# Patient Record
Sex: MALE | Race: OTHER | URBAN - METROPOLITAN AREA
[De-identification: names, ages, dates, MRNs, and addresses within clinical notes are randomized per-mention and may not be internally consistent; named-entity substitution may affect disease eponyms.]

---
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
## 2025-01-01 ENCOUNTER — APPOINTMENT (OUTPATIENT)
Dept: RADIOLOGY | Facility: MEDICAL CENTER | Age: 25
DRG: 951 | End: 2025-01-01

## 2025-01-01 ENCOUNTER — HOSPITAL ENCOUNTER (INPATIENT)
Facility: MEDICAL CENTER | Age: 25
LOS: 1 days | DRG: 951 | End: 2025-04-12
Attending: EMERGENCY MEDICINE | Admitting: INTERNAL MEDICINE

## 2025-01-01 ENCOUNTER — APPOINTMENT (OUTPATIENT)
Dept: RADIOLOGY | Facility: MEDICAL CENTER | Age: 25
DRG: 951 | End: 2025-01-01
Attending: EMERGENCY MEDICINE

## 2025-01-01 ENCOUNTER — HOSPITAL ENCOUNTER (EMERGENCY)
Facility: MEDICAL CENTER | Age: 25
End: 2025-04-09
Attending: EMERGENCY MEDICINE

## 2025-01-01 ENCOUNTER — APPOINTMENT (OUTPATIENT)
Dept: CARDIOLOGY | Facility: MEDICAL CENTER | Age: 25
DRG: 951 | End: 2025-01-01
Attending: INTERNAL MEDICINE

## 2025-01-01 VITALS
OXYGEN SATURATION: 99 % | RESPIRATION RATE: 29 BRPM | TEMPERATURE: 97.6 F | HEIGHT: 68 IN | BODY MASS INDEX: 21.48 KG/M2 | HEART RATE: 86 BPM | WEIGHT: 141.76 LBS | SYSTOLIC BLOOD PRESSURE: 88 MMHG | DIASTOLIC BLOOD PRESSURE: 53 MMHG

## 2025-01-01 VITALS
BODY MASS INDEX: 20.34 KG/M2 | RESPIRATION RATE: 16 BRPM | WEIGHT: 150.13 LBS | SYSTOLIC BLOOD PRESSURE: 120 MMHG | HEART RATE: 52 BPM | HEIGHT: 72 IN | DIASTOLIC BLOOD PRESSURE: 76 MMHG | OXYGEN SATURATION: 96 % | TEMPERATURE: 97 F

## 2025-01-01 DIAGNOSIS — G93.89 BRAIN MASS: ICD-10-CM

## 2025-01-01 DIAGNOSIS — R41.82 ALTERED MENTAL STATUS, UNSPECIFIED ALTERED MENTAL STATUS TYPE: ICD-10-CM

## 2025-01-01 DIAGNOSIS — G43.009 MIGRAINE WITHOUT AURA AND WITHOUT STATUS MIGRAINOSUS, NOT INTRACTABLE: ICD-10-CM

## 2025-01-01 LAB
ABO GROUP BLD: NORMAL
ALBUMIN SERPL BCP-MCNC: 5.4 G/DL (ref 3.2–4.9)
ALBUMIN/GLOB SERPL: 1.7 G/DL
ALP SERPL-CCNC: 58 U/L (ref 30–99)
ALT SERPL-CCNC: 13 U/L (ref 2–50)
ANION GAP SERPL CALC-SCNC: 24 MMOL/L (ref 7–16)
APTT PPP: 23.7 SEC (ref 24.7–36)
AST SERPL-CCNC: 23 U/L (ref 12–45)
BASE EXCESS BLDA CALC-SCNC: -3 MMOL/L (ref -4–3)
BASOPHILS # BLD AUTO: 0.3 % (ref 0–1.8)
BASOPHILS # BLD: 0.07 K/UL (ref 0–0.12)
BILIRUB SERPL-MCNC: 1.1 MG/DL (ref 0.1–1.5)
BLD GP AB SCN SERPL QL: NORMAL
BODY TEMPERATURE: ABNORMAL DEGREES
BREATHS SETTING VENT: 24
BUN SERPL-MCNC: 25 MG/DL (ref 8–22)
CALCIUM ALBUM COR SERPL-MCNC: 8.8 MG/DL (ref 8.5–10.5)
CALCIUM SERPL-MCNC: 9.9 MG/DL (ref 8.5–10.5)
CHLORIDE SERPL-SCNC: 107 MMOL/L (ref 96–112)
CO2 BLDA-SCNC: 23 MMOL/L (ref 32–48)
CO2 SERPL-SCNC: 16 MMOL/L (ref 20–33)
CREAT SERPL-MCNC: 1.29 MG/DL (ref 0.5–1.4)
DELSYS IDSYS: ABNORMAL
EKG IMPRESSION: NORMAL
EOSINOPHIL # BLD AUTO: 0.05 K/UL (ref 0–0.51)
EOSINOPHIL NFR BLD: 0.2 % (ref 0–6.9)
ERYTHROCYTE [DISTWIDTH] IN BLOOD BY AUTOMATED COUNT: 36 FL (ref 35.9–50)
EST. AVERAGE GLUCOSE BLD GHB EST-MCNC: 108 MG/DL
GFR SERPLBLD CREATININE-BSD FMLA CKD-EPI: 80 ML/MIN/1.73 M 2
GLOBULIN SER CALC-MCNC: 3.1 G/DL (ref 1.9–3.5)
GLUCOSE BLD STRIP.AUTO-MCNC: 111 MG/DL (ref 65–99)
GLUCOSE SERPL-MCNC: 199 MG/DL (ref 65–99)
HBA1C MFR BLD: 5.4 % (ref 4–5.6)
HCO3 BLDA-SCNC: 21.7 MMOL/L (ref 21–28)
HCT VFR BLD AUTO: 47.3 % (ref 42–52)
HGB BLD-MCNC: 16.3 G/DL (ref 14–18)
HOROWITZ INDEX BLDA+IHG-RTO: 314 MM[HG]
IMM GRANULOCYTES # BLD AUTO: 0.19 K/UL (ref 0–0.11)
IMM GRANULOCYTES NFR BLD AUTO: 0.8 % (ref 0–0.9)
INR PPP: 1.12 (ref 0.87–1.13)
LACTATE BLD-SCNC: 3.7 MMOL/L (ref 0.5–2)
LYMPHOCYTES # BLD AUTO: 2.9 K/UL (ref 1–4.8)
LYMPHOCYTES NFR BLD: 11.7 % (ref 22–41)
MAGNESIUM SERPL-MCNC: 2 MG/DL (ref 1.5–2.5)
MCH RBC QN AUTO: 29.8 PG (ref 27–33)
MCHC RBC AUTO-ENTMCNC: 34.5 G/DL (ref 32.3–36.5)
MCV RBC AUTO: 86.5 FL (ref 81.4–97.8)
MODE IMODE: ABNORMAL
MONOCYTES # BLD AUTO: 1.4 K/UL (ref 0–0.85)
MONOCYTES NFR BLD AUTO: 5.6 % (ref 0–13.4)
NEUTROPHILS # BLD AUTO: 20.28 K/UL (ref 1.82–7.42)
NEUTROPHILS NFR BLD: 81.4 % (ref 44–72)
NRBC # BLD AUTO: 0 K/UL
NRBC BLD-RTO: 0 /100 WBC (ref 0–0.2)
O2/TOTAL GAS SETTING VFR VENT: 50 %
PCO2 BLDA: 37.3 MMHG (ref 32–48)
PCO2 TEMP ADJ BLDA: 36.3 MMHG (ref 32–48)
PEEP END EXPIRATORY PRESSURE IPEEP: 8 CMH20
PH BLDA: 7.37 [PH] (ref 7.35–7.45)
PH TEMP ADJ BLDA: 7.38 [PH] (ref 7.35–7.45)
PLATELET # BLD AUTO: 360 K/UL (ref 164–446)
PMV BLD AUTO: 10.6 FL (ref 9–12.9)
PO2 BLDA: 157 MMHG (ref 83–108)
PO2 TEMP ADJ BLDA: 154 MMHG (ref 83–108)
POTASSIUM SERPL-SCNC: 3.8 MMOL/L (ref 3.6–5.5)
PROT SERPL-MCNC: 8.5 G/DL (ref 6–8.2)
PROTHROMBIN TIME: 14.5 SEC (ref 12–14.6)
RBC # BLD AUTO: 5.47 M/UL (ref 4.7–6.1)
RH BLD: NORMAL
SAO2 % BLDA: 99 % (ref 93–99)
SODIUM SERPL-SCNC: 147 MMOL/L (ref 135–145)
SPECIMEN DRAWN FROM PATIENT: ABNORMAL
TIDAL VOLUME IVT: 400 ML
TRIGL SERPL-MCNC: 142 MG/DL (ref 0–149)
TROPONIN T SERPL-MCNC: 7 NG/L (ref 6–19)
WBC # BLD AUTO: 24.9 K/UL (ref 4.8–10.8)

## 2025-01-01 PROCEDURE — 99291 CRITICAL CARE FIRST HOUR: CPT | Performed by: INTERNAL MEDICINE

## 2025-01-01 PROCEDURE — 71045 X-RAY EXAM CHEST 1 VIEW: CPT

## 2025-01-01 PROCEDURE — 85025 COMPLETE CBC W/AUTO DIFF WBC: CPT

## 2025-01-01 PROCEDURE — 83605 ASSAY OF LACTIC ACID: CPT

## 2025-01-01 PROCEDURE — 83735 ASSAY OF MAGNESIUM: CPT

## 2025-01-01 PROCEDURE — 82803 BLOOD GASES ANY COMBINATION: CPT

## 2025-01-01 PROCEDURE — 93325 DOPPLER ECHO COLOR FLOW MAPG: CPT

## 2025-01-01 PROCEDURE — 96366 THER/PROPH/DIAG IV INF ADDON: CPT

## 2025-01-01 PROCEDURE — 0BH17EZ INSERTION OF ENDOTRACHEAL AIRWAY INTO TRACHEA, VIA NATURAL OR ARTIFICIAL OPENING: ICD-10-PCS | Performed by: EMERGENCY MEDICINE

## 2025-01-01 PROCEDURE — 86900 BLOOD TYPING SEROLOGIC ABO: CPT

## 2025-01-01 PROCEDURE — 96375 TX/PRO/DX INJ NEW DRUG ADDON: CPT

## 2025-01-01 PROCEDURE — 700111 HCHG RX REV CODE 636 W/ 250 OVERRIDE (IP): Mod: JZ | Performed by: EMERGENCY MEDICINE

## 2025-01-01 PROCEDURE — 96365 THER/PROPH/DIAG IV INF INIT: CPT

## 2025-01-01 PROCEDURE — 304538 HCHG NG TUBE

## 2025-01-01 PROCEDURE — 700111 HCHG RX REV CODE 636 W/ 250 OVERRIDE (IP): Mod: JZ | Performed by: INTERNAL MEDICINE

## 2025-01-01 PROCEDURE — 5A1935Z RESPIRATORY VENTILATION, LESS THAN 24 CONSECUTIVE HOURS: ICD-10-PCS | Performed by: INTERNAL MEDICINE

## 2025-01-01 PROCEDURE — 86901 BLOOD TYPING SEROLOGIC RH(D): CPT

## 2025-01-01 PROCEDURE — 700101 HCHG RX REV CODE 250: Performed by: EMERGENCY MEDICINE

## 2025-01-01 PROCEDURE — 84478 ASSAY OF TRIGLYCERIDES: CPT

## 2025-01-01 PROCEDURE — 72125 CT NECK SPINE W/O DYE: CPT

## 2025-01-01 PROCEDURE — 36600 WITHDRAWAL OF ARTERIAL BLOOD: CPT

## 2025-01-01 PROCEDURE — 99283 EMERGENCY DEPT VISIT LOW MDM: CPT

## 2025-01-01 PROCEDURE — 84484 ASSAY OF TROPONIN QUANT: CPT

## 2025-01-01 PROCEDURE — 99254 IP/OBS CNSLTJ NEW/EST MOD 60: CPT

## 2025-01-01 PROCEDURE — 99292 CRITICAL CARE ADDL 30 MIN: CPT | Performed by: INTERNAL MEDICINE

## 2025-01-01 PROCEDURE — 70498 CT ANGIOGRAPHY NECK: CPT

## 2025-01-01 PROCEDURE — 303105 HCHG CATHETER EXTRA

## 2025-01-01 PROCEDURE — A9270 NON-COVERED ITEM OR SERVICE: HCPCS | Performed by: EMERGENCY MEDICINE

## 2025-01-01 PROCEDURE — 770001 HCHG ROOM/CARE - MED/SURG/GYN PRIV*

## 2025-01-01 PROCEDURE — 51702 INSERT TEMP BLADDER CATH: CPT

## 2025-01-01 PROCEDURE — 93005 ELECTROCARDIOGRAM TRACING: CPT | Mod: TC

## 2025-01-01 PROCEDURE — 700117 HCHG RX CONTRAST REV CODE 255: Performed by: INTERNAL MEDICINE

## 2025-01-01 PROCEDURE — 700105 HCHG RX REV CODE 258: Performed by: EMERGENCY MEDICINE

## 2025-01-01 PROCEDURE — 93325 DOPPLER ECHO COLOR FLOW MAPG: CPT | Mod: 26 | Performed by: STUDENT IN AN ORGANIZED HEALTH CARE EDUCATION/TRAINING PROGRAM

## 2025-01-01 PROCEDURE — 70496 CT ANGIOGRAPHY HEAD: CPT

## 2025-01-01 PROCEDURE — 93308 TTE F-UP OR LMTD: CPT | Mod: 26 | Performed by: STUDENT IN AN ORGANIZED HEALTH CARE EDUCATION/TRAINING PROGRAM

## 2025-01-01 PROCEDURE — 85730 THROMBOPLASTIN TIME PARTIAL: CPT

## 2025-01-01 PROCEDURE — 93321 DOPPLER ECHO F-UP/LMTD STD: CPT | Mod: 26 | Performed by: STUDENT IN AN ORGANIZED HEALTH CARE EDUCATION/TRAINING PROGRAM

## 2025-01-01 PROCEDURE — 700111 HCHG RX REV CODE 636 W/ 250 OVERRIDE (IP): Performed by: INTERNAL MEDICINE

## 2025-01-01 PROCEDURE — 700102 HCHG RX REV CODE 250 W/ 637 OVERRIDE(OP): Performed by: EMERGENCY MEDICINE

## 2025-01-01 PROCEDURE — 82962 GLUCOSE BLOOD TEST: CPT

## 2025-01-01 PROCEDURE — 93005 ELECTROCARDIOGRAM TRACING: CPT | Mod: TC | Performed by: EMERGENCY MEDICINE

## 2025-01-01 PROCEDURE — 700117 HCHG RX CONTRAST REV CODE 255: Performed by: EMERGENCY MEDICINE

## 2025-01-01 PROCEDURE — 86850 RBC ANTIBODY SCREEN: CPT

## 2025-01-01 PROCEDURE — 80053 COMPREHEN METABOLIC PANEL: CPT

## 2025-01-01 PROCEDURE — 36415 COLL VENOUS BLD VENIPUNCTURE: CPT

## 2025-01-01 PROCEDURE — 94002 VENT MGMT INPAT INIT DAY: CPT

## 2025-01-01 PROCEDURE — 83036 HEMOGLOBIN GLYCOSYLATED A1C: CPT

## 2025-01-01 PROCEDURE — 85610 PROTHROMBIN TIME: CPT

## 2025-01-01 PROCEDURE — 31500 INSERT EMERGENCY AIRWAY: CPT

## 2025-01-01 PROCEDURE — 99291 CRITICAL CARE FIRST HOUR: CPT

## 2025-01-01 RX ORDER — LEVETIRACETAM 500 MG/5ML
2000 INJECTION, SOLUTION, CONCENTRATE INTRAVENOUS ONCE
Status: COMPLETED | OUTPATIENT
Start: 2025-01-01 | End: 2025-01-01

## 2025-01-01 RX ORDER — LORAZEPAM 2 MG/ML
1 CONCENTRATE ORAL
Status: DISCONTINUED | OUTPATIENT
Start: 2025-01-01 | End: 2025-04-13 | Stop reason: HOSPADM

## 2025-01-01 RX ORDER — HYDROMORPHONE HYDROCHLORIDE 2 MG/ML
4 INJECTION, SOLUTION INTRAMUSCULAR; INTRAVENOUS; SUBCUTANEOUS ONCE
Status: COMPLETED | OUTPATIENT
Start: 2025-01-01 | End: 2025-01-01

## 2025-01-01 RX ORDER — POLYETHYLENE GLYCOL 3350 17 G/17G
1 POWDER, FOR SOLUTION ORAL
Status: DISCONTINUED | OUTPATIENT
Start: 2025-01-01 | End: 2025-01-01

## 2025-01-01 RX ORDER — INSULIN LISPRO 100 [IU]/ML
2-9 INJECTION, SOLUTION INTRAVENOUS; SUBCUTANEOUS EVERY 6 HOURS
Status: DISCONTINUED | OUTPATIENT
Start: 2025-01-01 | End: 2025-01-01

## 2025-01-01 RX ORDER — SUMATRIPTAN SUCCINATE 25 MG/1
25-50 TABLET ORAL
Qty: 10 TABLET | Refills: 1 | Status: SHIPPED | OUTPATIENT
Start: 2025-01-01 | End: 2025-04-13

## 2025-01-01 RX ORDER — BISACODYL 10 MG
10 SUPPOSITORY, RECTAL RECTAL
Status: DISCONTINUED | OUTPATIENT
Start: 2025-01-01 | End: 2025-01-01

## 2025-01-01 RX ORDER — AMOXICILLIN 250 MG
2 CAPSULE ORAL 2 TIMES DAILY
Status: DISCONTINUED | OUTPATIENT
Start: 2025-01-01 | End: 2025-01-01

## 2025-01-01 RX ORDER — ACETAMINOPHEN 325 MG/1
650 TABLET ORAL EVERY 4 HOURS PRN
Status: DISCONTINUED | OUTPATIENT
Start: 2025-01-01 | End: 2025-04-13 | Stop reason: HOSPADM

## 2025-01-01 RX ORDER — ATROPINE SULFATE 10 MG/ML
2 SOLUTION/ DROPS OPHTHALMIC EVERY 4 HOURS PRN
Status: DISCONTINUED | OUTPATIENT
Start: 2025-01-01 | End: 2025-04-13 | Stop reason: HOSPADM

## 2025-01-01 RX ORDER — DEXAMETHASONE SODIUM PHOSPHATE 10 MG/ML
10 INJECTION, SOLUTION INTRAMUSCULAR; INTRAVENOUS ONCE
Status: COMPLETED | OUTPATIENT
Start: 2025-01-01 | End: 2025-01-01

## 2025-01-01 RX ORDER — ROCURONIUM BROMIDE 10 MG/ML
50 INJECTION, SOLUTION INTRAVENOUS ONCE
Status: COMPLETED | OUTPATIENT
Start: 2025-01-01 | End: 2025-01-01

## 2025-01-01 RX ORDER — IPRATROPIUM BROMIDE AND ALBUTEROL SULFATE 2.5; .5 MG/3ML; MG/3ML
3 SOLUTION RESPIRATORY (INHALATION)
Status: DISCONTINUED | OUTPATIENT
Start: 2025-01-01 | End: 2025-04-13 | Stop reason: HOSPADM

## 2025-01-01 RX ORDER — HYDROMORPHONE HYDROCHLORIDE 1 MG/ML
1 INJECTION, SOLUTION INTRAMUSCULAR; INTRAVENOUS; SUBCUTANEOUS
Status: DISCONTINUED | OUTPATIENT
Start: 2025-01-01 | End: 2025-04-13 | Stop reason: HOSPADM

## 2025-01-01 RX ORDER — MANNITOL 250 MG/ML
50 INJECTION, SOLUTION INTRAVENOUS ONCE
Status: COMPLETED | OUTPATIENT
Start: 2025-01-01 | End: 2025-01-01

## 2025-01-01 RX ORDER — FAMOTIDINE 20 MG/1
20 TABLET, FILM COATED ORAL EVERY 12 HOURS
Status: DISCONTINUED | OUTPATIENT
Start: 2025-01-01 | End: 2025-01-01

## 2025-01-01 RX ORDER — DIPHENHYDRAMINE HYDROCHLORIDE 50 MG/ML
50 INJECTION, SOLUTION INTRAMUSCULAR; INTRAVENOUS ONCE
Status: COMPLETED | OUTPATIENT
Start: 2025-01-01 | End: 2025-01-01

## 2025-01-01 RX ORDER — IBUPROFEN 600 MG/1
600 TABLET, FILM COATED ORAL ONCE
Status: COMPLETED | OUTPATIENT
Start: 2025-01-01 | End: 2025-01-01

## 2025-01-01 RX ORDER — DIAZEPAM 10 MG/2ML
5 INJECTION, SOLUTION INTRAMUSCULAR; INTRAVENOUS
Status: DISCONTINUED | OUTPATIENT
Start: 2025-01-01 | End: 2025-04-13 | Stop reason: HOSPADM

## 2025-01-01 RX ORDER — HYDROMORPHONE HYDROCHLORIDE 1 MG/ML
0.5 INJECTION, SOLUTION INTRAMUSCULAR; INTRAVENOUS; SUBCUTANEOUS
Status: DISCONTINUED | OUTPATIENT
Start: 2025-01-01 | End: 2025-04-13 | Stop reason: HOSPADM

## 2025-01-01 RX ORDER — METHYLPREDNISOLONE SODIUM SUCCINATE 125 MG/2ML
125 INJECTION, POWDER, LYOPHILIZED, FOR SOLUTION INTRAMUSCULAR; INTRAVENOUS ONCE
Status: COMPLETED | OUTPATIENT
Start: 2025-01-01 | End: 2025-01-01

## 2025-01-01 RX ORDER — PROPOFOL 10 MG/ML
60 INJECTION, EMULSION INTRAVENOUS ONCE
Status: COMPLETED | OUTPATIENT
Start: 2025-01-01 | End: 2025-01-01

## 2025-01-01 RX ORDER — DEXTROSE MONOHYDRATE 25 G/50ML
25 INJECTION, SOLUTION INTRAVENOUS
Status: DISCONTINUED | OUTPATIENT
Start: 2025-01-01 | End: 2025-01-01

## 2025-01-01 RX ORDER — LABETALOL HYDROCHLORIDE 5 MG/ML
10-20 INJECTION, SOLUTION INTRAVENOUS
Status: DISCONTINUED | OUTPATIENT
Start: 2025-01-01 | End: 2025-04-13 | Stop reason: HOSPADM

## 2025-01-01 RX ORDER — SUMATRIPTAN SUCCINATE 25 MG/1
25 TABLET ORAL ONCE
Status: COMPLETED | OUTPATIENT
Start: 2025-01-01 | End: 2025-01-01

## 2025-01-01 RX ADMIN — LEVETIRACETAM 2000 MG: 100 INJECTION, SOLUTION INTRAVENOUS at 10:29

## 2025-01-01 RX ADMIN — ROCURONIUM BROMIDE 50 MG: 10 INJECTION INTRAVENOUS at 09:46

## 2025-01-01 RX ADMIN — PROPOFOL 60 MG: 10 INJECTION, EMULSION INTRAVENOUS at 09:46

## 2025-01-01 RX ADMIN — PROPOFOL 5 MCG/KG/MIN: 10 INJECTION, EMULSION INTRAVENOUS at 10:06

## 2025-01-01 RX ADMIN — FAMOTIDINE 40 MG: 10 INJECTION, SOLUTION INTRAVENOUS at 14:21

## 2025-01-01 RX ADMIN — SUMATRIPTAN SUCCINATE 25 MG: 25 TABLET ORAL at 09:18

## 2025-01-01 RX ADMIN — HUMAN ALBUMIN MICROSPHERES AND PERFLUTREN 3 ML: 10; .22 INJECTION, SOLUTION INTRAVENOUS at 17:14

## 2025-01-01 RX ADMIN — METHYLPREDNISOLONE SODIUM SUCCINATE 125 MG: 125 INJECTION, POWDER, FOR SOLUTION INTRAMUSCULAR; INTRAVENOUS at 14:21

## 2025-01-01 RX ADMIN — HYDROMORPHONE HYDROCHLORIDE 4 MG: 2 INJECTION INTRAMUSCULAR; INTRAVENOUS; SUBCUTANEOUS at 11:18

## 2025-01-01 RX ADMIN — HYDROMORPHONE HYDROCHLORIDE 1 MG: 1 INJECTION, SOLUTION INTRAMUSCULAR; INTRAVENOUS; SUBCUTANEOUS at 18:13

## 2025-01-01 RX ADMIN — DIPHENHYDRAMINE HYDROCHLORIDE 50 MG: 50 INJECTION, SOLUTION INTRAMUSCULAR; INTRAVENOUS at 15:39

## 2025-01-01 RX ADMIN — SODIUM CHLORIDE 250 ML: 3 INJECTION, SOLUTION INTRAVENOUS at 10:53

## 2025-01-01 RX ADMIN — DIAZEPAM 5 MG: 10 INJECTION, SOLUTION INTRAMUSCULAR; INTRAVENOUS at 18:12

## 2025-01-01 RX ADMIN — DEXAMETHASONE SODIUM PHOSPHATE 10 MG: 10 INJECTION, SOLUTION INTRAMUSCULAR; INTRAVENOUS at 10:56

## 2025-01-01 RX ADMIN — IBUPROFEN 600 MG: 600 TABLET, FILM COATED ORAL at 09:18

## 2025-01-01 RX ADMIN — MANNITOL 50 G: 12.5 INJECTION, SOLUTION INTRAVENOUS at 09:59

## 2025-01-01 RX ADMIN — IOHEXOL 80 ML: 350 INJECTION, SOLUTION INTRAVENOUS at 11:00

## 2025-01-01 ASSESSMENT — FIBROSIS 4 INDEX: FIB4 SCORE: 0.41

## 2025-01-01 ASSESSMENT — PAIN DESCRIPTION - PAIN TYPE
TYPE: ACUTE PAIN
TYPE: ACUTE PAIN

## 2025-02-10 ENCOUNTER — APPOINTMENT (OUTPATIENT)
Dept: RADIOLOGY | Facility: MEDICAL CENTER | Age: 25
End: 2025-02-10
Attending: EMERGENCY MEDICINE

## 2025-02-10 ENCOUNTER — HOSPITAL ENCOUNTER (EMERGENCY)
Facility: MEDICAL CENTER | Age: 25
End: 2025-02-10
Attending: EMERGENCY MEDICINE

## 2025-02-10 VITALS
WEIGHT: 165.79 LBS | DIASTOLIC BLOOD PRESSURE: 83 MMHG | OXYGEN SATURATION: 97 % | RESPIRATION RATE: 14 BRPM | HEART RATE: 79 BPM | BODY MASS INDEX: 22.46 KG/M2 | HEIGHT: 72 IN | TEMPERATURE: 97.3 F | SYSTOLIC BLOOD PRESSURE: 115 MMHG

## 2025-02-10 DIAGNOSIS — M79.10 MYALGIA: ICD-10-CM

## 2025-02-10 DIAGNOSIS — M54.9 PAIN, UPPER BACK: ICD-10-CM

## 2025-02-10 LAB
AMORPHOUS CRYSTALS 1764: PRESENT /HPF
APPEARANCE UR: ABNORMAL
BACTERIA #/AREA URNS HPF: ABNORMAL /HPF
BILIRUB UR QL STRIP.AUTO: NEGATIVE
CASTS URNS QL MICRO: ABNORMAL /LPF (ref 0–2)
COLOR UR: YELLOW
EPITHELIAL CELLS 1715: ABNORMAL /HPF (ref 0–5)
GLUCOSE UR STRIP.AUTO-MCNC: NEGATIVE MG/DL
HYALINE CAST   1831: PRESENT /LPF
KETONES UR STRIP.AUTO-MCNC: 15 MG/DL
LEUKOCYTE ESTERASE UR QL STRIP.AUTO: ABNORMAL
MICRO URNS: ABNORMAL
NITRITE UR QL STRIP.AUTO: NEGATIVE
PH UR STRIP.AUTO: 5.5 [PH] (ref 5–8)
PROT UR QL STRIP: 30 MG/DL
RBC # URNS HPF: ABNORMAL /HPF (ref 0–2)
RBC UR QL AUTO: ABNORMAL
SP GR UR STRIP.AUTO: 1.03
UROBILINOGEN UR STRIP.AUTO-MCNC: 1 EU/DL
WBC #/AREA URNS HPF: ABNORMAL /HPF

## 2025-02-10 PROCEDURE — 99283 EMERGENCY DEPT VISIT LOW MDM: CPT

## 2025-02-10 PROCEDURE — 700102 HCHG RX REV CODE 250 W/ 637 OVERRIDE(OP): Mod: UD | Performed by: EMERGENCY MEDICINE

## 2025-02-10 PROCEDURE — 81001 URINALYSIS AUTO W/SCOPE: CPT

## 2025-02-10 PROCEDURE — A9270 NON-COVERED ITEM OR SERVICE: HCPCS | Mod: UD | Performed by: EMERGENCY MEDICINE

## 2025-02-10 PROCEDURE — 72070 X-RAY EXAM THORAC SPINE 2VWS: CPT

## 2025-02-10 PROCEDURE — 72128 CT CHEST SPINE W/O DYE: CPT

## 2025-02-10 RX ORDER — NAPROXEN 500 MG/1
500 TABLET ORAL ONCE
Status: COMPLETED | OUTPATIENT
Start: 2025-02-10 | End: 2025-02-10

## 2025-02-10 RX ADMIN — NAPROXEN 500 MG: 500 TABLET ORAL at 11:24

## 2025-02-10 ASSESSMENT — PAIN DESCRIPTION - PAIN TYPE: TYPE: ACUTE PAIN

## 2025-02-10 NOTE — ED TRIAGE NOTES
Chief Complaint   Patient presents with    Back Pain     Since Saturday, pt reports generalized back pain which has since radiated into legs and arms. Pt denies trauma.     Pt ambulatory to triage for above complaint.     Pt is alert & oriented and follows commands. Pt speaking in full sentences and responds appropriately to questions. No acute distress noted in triage. Respirations are even and unlabored. Skin is pink/warm/dry.    Pt placed back in lobby and educated on triage process. Pt encouraged to alert staff to any changes in condition.

## 2025-02-10 NOTE — ED NOTES
Pt has discharge orders. Pt educated on discharge instructions.  Pt verbalizes understanding.  Pt ambulatory to lobby with steady gait. Pt going home with self.

## 2025-02-10 NOTE — DISCHARGE INSTRUCTIONS
You may have musculoskeletal back pain.  You may have early influenza.  You will know if you develop fever and cough.  There is no evidence of significant trauma or neurologic problem.  Take ibuprofen 600 mg 3-4 times a day.  Add Tylenol 650 mg as needed for persistent pain.  You can work without limitation.  Follow-up with a doctor if not better in a week.  Return for neurologic weakness or other concerning symptoms.  These are not expected.

## 2025-02-10 NOTE — ED PROVIDER NOTES
ED Provider Note    CHIEF COMPLAINT  Chief Complaint   Patient presents with    Back Pain     Since Saturday, pt reports generalized back pain which has since radiated into legs and arms. Pt denies trauma.        EXTERNAL RECORDS REVIEWED  No old notes available    HPI  Tom Vidal is a 24 y.o. male who presents to the Emergency Department for upper back pain onset 2 days ago.  He awakened from sleep with discomfort.  No trauma or repetitive use injury.  The pain is currently in the upper back and radiates down into his proximal thighs and his arms.  He denies any weakness or numbness.  No prior fracture or disc injury or surgery.  No fever or injection medication use.      LIMITATION TO HISTORY   None     OUTSIDE HISTORIAN(S):  None    REVIEW OF SYSTEMS  Pertinent positives include: Upper back pain.  Pertinent negatives include: Weakness numbness fever.    PAST MEDICAL HISTORY  Denies    SOCIAL HISTORY  Social History     Tobacco Use    Smoking status: Never    Smokeless tobacco: Never   Vaping Use    Vaping status: Never Used   Substance Use Topics    Alcohol use: Yes     Comment: occa    Drug use: Never     Social History     Substance and Sexual Activity   Drug Use Never       CURRENT MEDICATIONS  None    ALLERGIES  No Known Allergies    PHYSICAL EXAM  VITAL SIGNS: BP (!) 141/81   Pulse (!) 109   Temp 36.3 °C (97.3 °F) (Temporal)   Resp 18   Ht 1.829 m (6')   Wt 75.2 kg (165 lb 12.6 oz)   SpO2 96%   BMI 22.48 kg/m²   Reviewed and hypertensive tachycardic afebrile  Constitutional: Well developed, Well nourished, well-appearing.  HENT: Normocephalic, atraumatic, bilateral external ears normal,   Eyes: PERRLA, no discharge, no scleral icterus.   Cardiovascular: Regular rate and rhythm. No murmurs, rubs or gallops.  No dependent edema or calf tenderness  Respiratory: Lungs clear to auscultation bilaterally. No wheezes, rales, or rhonchi.  Abdominal:  Abdomen soft, non-tender, non distended. No  rebound, or guarding.    Skin: No erythema, no rash. No bruising or wounds.   Musculoskeletal: no deformities.  He has pain without much tenderness in the mid thoracic spine.  There is no tenderness of the cervical or lumbar spine.  Neurologic: Age appropriate mental status, cranial nerves 2-12 intact by passive exam.  Straight leg raise negative.  Wrist extension, flexion, finger abduction, and thumb opposition, biceps, triceps and shoulder abduction are 5/5 bilaterally.   Extensor hallucis longus and ankle plantar flexion are symmetric.  Hip flexion and extension 5 of 5.  Deep tendon reflexes 1+ patellar Achilles biceps and triceps.  Sensation is intact on the pad of the first and fifth finger, over the first dorsal web space of the hand, And over the deltoid.  Sensation is intact to light touch in both legs.       LABS Ordered and Reviewed by Me:  Results for orders placed or performed during the hospital encounter of 02/10/25   URINALYSIS    Collection Time: 02/10/25 11:23 AM    Specimen: Urine   Result Value Ref Range    Color Yellow     Character Turbid (A)     Specific Gravity 1.027 <1.035    Ph 5.5 5.0 - 8.0    Glucose Negative Negative mg/dL    Ketones 15 (A) Negative mg/dL    Protein 30 (A) Negative mg/dL    Bilirubin Negative Negative    Urobilinogen, Urine 1.0 <=1.0 EU/dL    Nitrite Negative Negative    Leukocyte Esterase Trace (A) Negative    Occult Blood Moderate (A) Negative    Micro Urine Req Microscopic    URINE MICROSCOPIC (W/UA)    Collection Time: 02/10/25 11:23 AM   Result Value Ref Range    WBC 0-2 /hpf    RBC 3-5 (A) 0 - 2 /hpf    Bacteria None None /hpf    Epithelial Cells 0-2 0 - 5 /hpf    Amorphous Crystal Present (A) Absent /hpf    Urine Casts 3-5 (A) 0 - 2 /lpf    Hyaline Cast Present /lpf            RADIOLOGY  I have independently interpreted the T-spine x-ray associated with this visit demonstrating concave superior endplates.  I am awaiting the final reading from the radiologist which  will be documented below.     Final Radiology Report  CT-TSPINE W/O PLUS RECONS   Final Result      1. Superior endplate depressions involving the C5 through C8 vertebral bodies, age indeterminate. Findings could be chronic posttraumatic. Further evaluation with MRI of the thoracic spine without IV contrast is recommended to evaluate for vertebral body    edema that would indicate a more acute process.   2. The remainder of the CT of the thoracic spine without IV contrast is within normal limits.      DX-THORACIC SPINE-2 VIEWS   Final Result      1. Superior endplate deformities involving the T6 and T8 vertebral bodies. Further evaluation with CT of the thoracic spine without IV contrast is recommended.   2. The remainder of the thoracic spine radiography is within normal limits.        Radiologist interpretation have been reviewed by me.       ED COURSE:    INTERVENTIONS BY ME:  Medications   naproxen (Naprosyn) tablet 500 mg (500 mg Oral Given 2/10/25 1124)         ASSESSMENT, COURSE AND PLAN:  PROBLEMS EVALUATED THIS VISIT:    This patient presents with upper back pain and myalgias.  On initial x-ray of the radiologist was concerned about superior endplate fractures which would be really unusual in a young man without a history of trauma.  CT imaging suggested that these were not injuries.  The CT however raised a question of superior endplate injury in the cervical spine.  The patient was reevaluated and is had no neck pain so I think this is also an anatomic variant.  His pain is likely musculoskeletal or he has early influenza and will go on to develop fever and cough.  There is no radiculopathy or myelopathy.  He denies fever and injection medication or drug use No is low risk for epidural abscess.  His pain is above the level of the conus.      DISPOSITION AND DISCUSSIONS      RISK:  Barrier to care is no primary physician so I will refer him to [facility]    MY PLAN:  NSAIDs and  acetaminophen    Return for fever and back pain, neurologic weakness    Followup:  [facility] - Primary Care and Family Medicine  [address] [address] [phone]  Schedule an appointment as soon as possible for a visit in 1 week  As needed if not better      CONDITION: Stable.     FINAL IMPRESSION  1. Pain, upper back    2. Myalgia         Thomas Roberts M.D., 02/10/25  3:27 PM

## 2025-04-09 NOTE — DISCHARGE INSTRUCTIONS
Insert DC migraine insert DC migraineMigraine Headache    Take ibuprofen and imitrex immediately at the first hint of headache.  Return for sudden onset, severe headache, headache and fever or headache and weakness.Followup if not better in a week.    You have a migraine headache. Symptoms of migraines include a throbbing headache, made worse by activity. Sometimes only one side of the head hurts. Nausea, vomiting and sinus pain or stuffiness is common with migraines. Visual symptoms such as light sensitivity, blind spots, or flashing lights may also occur. Loud noises may make migraine pain worse. Migraine headaches are caused by changes in the size of the blood vessels in the head and neck. Many factors may cause this problem including:  Emotional stress, lack of sleep, and menstrual periods.  Alcohol and some drugs (such as birth control pills).  Diet factors (fasting, caffeine, food preservatives, chocolate).  Environmental factors (weather changes, bright lights, odors, smoke).  Jarring motions and loud noises may also bring on a migraine. Prevention of migraines includes dealing with the trigger factors.    Treatment may require medicines for pain, inflammation, and vomiting. Injections for pain and IV fluids can be used if the headache is very severe, or if you are vomiting repeatedly. Get plenty of rest over the next 24 hours.  Lie down in a quiet, dark place and try to sleep  Medicines to prevent the blood vessel changes may be prescribed.  For people with frequent migraines, other medicines such as beta blockers and antidepressants may be helpful. Please see your caregiver if you are not better in 1-2 days.     seek immediate medical care if:  You develop a high fever, repeated vomiting, dehydration, or extreme weakness  You develop fainting, worsening headache, confusion, or seizures  You develop numbness or weakness of the limbs    [employer]® Patient Information ©2007 [email or web address].

## 2025-04-09 NOTE — ED PROVIDER NOTES
ED Provider Note    ED Provider Note    CHIEF COMPLAINT  Chief Complaint   Patient presents with    Headache     X 1 week on and off        EXTERNAL RECORDS REVIEWED  External ER note reviewed from March 2022 for HIV testing after he had intercourse with a partner who had HIV.    HPI  Tom Lares is a 24 y.o. male who presents to the Emergency Department for a week of intermittent headaches.  They are bitemporal and left retro-orbital and associated with nausea vomiting and photophobia.  He denies a history of frequent headaches and of a history or family history of migraine diagnosis.  No head injury.  No fever.  He was sent for evaluation by his work because he vomited at work.  Denies neurologic weakness tooth pain or jaw pain.      LIMITATION TO HISTORY   None     OUTSIDE HISTORIAN(S):  None    REVIEW OF SYSTEMS  Pertinent positives include: Headache nausea vomiting photophobia change in pattern.  Pertinent negatives include: Fever tooth pain weakness numbness.    PAST MEDICAL HISTORY  Past Medical History:   Diagnosis Date    ASTHMA     medicated with nppb ??? med       SOCIAL HISTORY  Social History     Tobacco Use    Smoking status: Never    Smokeless tobacco: Never   Vaping Use    Vaping status: Never Used   Substance Use Topics    Alcohol use: Yes     Comment: occa    Drug use: Never     Social History     Substance and Sexual Activity   Drug Use Never       CURRENT MEDICATIONS  No current facility-administered medications for this encounter.    Current Outpatient Medications:     SUMAtriptan (IMITREX) 25 MG Tab tablet, Take 1-2 Tablets by mouth one time as needed for Migraine for up to 1 dose., Disp: 10 Tablet, Rfl: 1    ALLERGIES  Allergies   Allergen Reactions    Cat Hair Extract Shortness of Breath     And dogs,  Short of breath       PHYSICAL EXAM  VITAL SIGNS: /76   Pulse (!) 52   Temp 36.1 °C (97 °F) (Temporal)   Resp 16   Ht 1.829 m (6')   Wt 68.1 kg (150 lb 2.1 oz)   SpO2  96%   BMI 20.36 kg/m²   Reviewed and bradycardic  Constitutional: Well developed, Well nourished, well-appearing and headache free at this time.  HENT: Normocephalic, atraumatic, bilateral external ears normal, No intraoral erythema, edema, exudate.  Ears: Panic membranes pearly.  No TMJ crepitus or tooth tenderness.  Eyes: PERRLA, no discharge, no scleral icterus.   Cardiovascular: Regular rate and rhythm. No murmurs, rubs or gallops.  No dependent edema or calf tenderness  Respiratory: Lungs clear to auscultation bilaterally. No wheezes, rales, or rhonchi.  Abdominal:  Abdomen soft, non-tender, non distended. No rebound, or guarding.    Skin: No erythema, no rash. No bruising or wounds.   Musculoskeletal: no deformities.   Neurologic: Cranial nerves II-XII are intact, Grasp, biceps, extensor hallucis longus, ankle plantar flexion are 5/5 and symmetric, Finger nose finger normal. Sensation is intact to light touch in all 4 limbs.  No focal deficits noted.    RADIOLOGY  CT head imaging discussed with the patient and we decided to perform a trial of therapy first for migraine    ED COURSE:    INTERVENTIONS BY ME:  Medications   ibuprofen (Motrin) tablet 600 mg (600 mg Oral Given 4/9/25 0918)   SUMAtriptan (Imitrex) tablet 25 mg (25 mg Oral Given 4/9/25 0918)     Meds given because patient stated he felt as if the headache was coming on while he was here in the ER.    ASSESSMENT, COURSE AND PLAN:  PROBLEMS EVALUATED THIS VISIT:    This patient presents with a No 1 week pattern of intermittent headaches that appear to be migraines.  Tension headaches are also a possibility.  There is no history of head trauma.  Symptoms are atypical of subarachnoid hemorrhage.  His body habitus is atypical for idiopathic intracranial hypertension.  There is no evidence of dental infection sinusitis or meningitis.      DISPOSITION AND DISCUSSIONS    RISK:  Moderate given need for prescription medication management    MY  PLAN:  Discharge Medication List as of 4/9/2025  9:25 AM        START taking these medications    Details   SUMAtriptan (IMITREX) 25 MG Tab tablet Take 1-2 Tablets by mouth one time as needed for Migraine for up to 1 dose., Disp-10 Tablet, R-1, Normal             Ibuprofen immediately at the onset of headache    Migraine handout given    Return for thunderclap headache headache and fever headache and neurologic deficit    Followup:  [facility] - Primary Care and Family Medicine  [address] [place] [id number] [phone]  Schedule an appointment as soon as possible for a visit in 1 week  if headaches have not resolved      CONDITION: Stable.     FINAL IMPRESSION  1. Migraine without aura and without status migrainosus, not intractable         Thomas Roberts M.D., 04/09/25  11:07 AM

## 2025-04-09 NOTE — ED NOTES
Pt discharged to home. Pt was given follow up instructions and prescriptions for imitrex. Pt verbalized understanding of all instructions for discharge and is ambulatory out of ED with steady gait. AOx4

## 2025-04-09 NOTE — ED TRIAGE NOTES
Chief Complaint   Patient presents with    Headache     X 1 week on and off     Pt states he has been having migraines that come and go for the last week that are associated with nausea and vomiting. Patient states he was unable to works today. Denies blurred vision, just reports weakness and unable to tolerate PO food or water. Pt Ax0x4, ambulatory to triage, educated on wait time and triage process.       /76   Pulse (!) 58   Temp 36.3 °C (97.4 °F) (Temporal)   Resp 18   Ht 1.829 m (6')   Wt 68.1 kg (150 lb 2.1 oz)   SpO2 100%   BMI 20.36 kg/m²

## 2025-04-12 PROBLEM — D72.829 LEUKOCYTOSIS: Status: ACTIVE | Noted: 2025-01-01

## 2025-04-12 PROBLEM — E87.20 LACTIC ACIDOSIS: Status: ACTIVE | Noted: 2025-01-01

## 2025-04-12 PROBLEM — J96.01 ACUTE RESPIRATORY FAILURE WITH HYPOXIA (HCC): Status: ACTIVE | Noted: 2025-01-01

## 2025-04-12 PROBLEM — G93.40 ACUTE ENCEPHALOPATHY: Status: ACTIVE | Noted: 2025-01-01

## 2025-04-12 PROBLEM — E87.0 HYPERNATREMIA: Status: ACTIVE | Noted: 2025-01-01

## 2025-04-12 PROBLEM — G93.89 BRAIN MASS: Status: ACTIVE | Noted: 2025-01-01

## 2025-04-12 PROBLEM — G91.4: Status: ACTIVE | Noted: 2025-01-01

## 2025-04-12 PROBLEM — G93.5 UNCAL HERNIATION (HCC): Status: ACTIVE | Noted: 2025-01-01

## 2025-04-12 PROBLEM — D49.6: Status: ACTIVE | Noted: 2025-01-01

## 2025-04-12 PROBLEM — R73.9 HYPERGLYCEMIA: Status: ACTIVE | Noted: 2025-01-01

## 2025-04-12 NOTE — H&P
Critical Care History & Physical Note    Date of Service  4/12/2025    Primary Care Physician  Pcp Pt States None    Consultants  critical care, neurology, and neurosurgery    Specialist Names: Dr. Recinos and Dr. Ruff    Code Status  DNAR, I OK    Chief Complaint  Chief Complaint   Patient presents with    Unresponsive     Pt ESVIN ALLAN found unresponsive this morning by family. Blown L pupil.        History of Presenting Illness  All history was obtained from ER staff and father who was at the bedside as the patient is intubated and unresponsive at the time of my evaluation.    Tom Lares is a healthy 24 y.o. male who was found by his friend this morning unresponsive and activated EMS.  EMS reports that the patient had been complaining or worsening headache for the past week.  He was seen in the ER on 4/9 and treated for migraine headache.  Upon arrival to the ER the patient was exhibiting decerebrate posturing with left pupil dilation and GCS of 3.  He was emergently intubated and a STAT CT head was obtained showing a hemorrhagic brain mass with obstructive hydrocephalus with uncal herniation.  The ER MD, neurology, and neurosurgical team spoke with the patient's father who has made him a DNR and does not want him to suffer, but would like to explore the option of donation.  The patient will be admitted to the ICU for ongoing care.    The patient's father reiterated to me that he does not want his son to suffer and no CPR.  If donation is possible he would like a stokes procurement process.    I discussed the plan of care with family, bedside RN, charge RN, pharmacy, RT, and neurology and neurosurgery.    Review of Systems  Review of Systems   Unable to perform ROS: Intubated       Past Medical History   has a past medical history of ASTHMA.     Surgical History  No surgeries in the past    Family History  Family history reviewed with patient. There is no family history that is pertinent to the  chief complaint.     Social History   reports that he has never smoked. He has never used smokeless tobacco. He reports current alcohol use. He reports that he does not use drugs.    Allergies  Allergies   Allergen Reactions    Cat Hair Extract Shortness of Breath     And dogs,  Short of breath       Medications  Prior to Admission Medications   Prescriptions Last Dose Informant Patient Reported? Taking?   SUMAtriptan (IMITREX) 25 MG Tab tablet Unknown Historical No No   Sig: Take 1-2 Tablets by mouth one time as needed for Migraine for up to 1 dose.      Facility-Administered Medications: None       Physical Exam  Temp:  [36.4 °C (97.6 °F)] 36.4 °C (97.6 °F)  Pulse:  [] 37  Resp:  [9-28] 24  BP: (122-169)/() 133/81  SpO2:  [93 %-100 %] 99 %  Blood Pressure: 133/81   Temperature: 36.4 °C (97.6 °F)   Pulse: (!) 37   Respiration: (!) 24   Pulse Oximetry: 99 %       Physical Exam  Vitals and nursing note reviewed.   Constitutional:       General: He is not in acute distress.     Appearance: He is normal weight. He is not toxic-appearing.   HENT:      Head: Normocephalic and atraumatic.      Right Ear: External ear normal.      Left Ear: External ear normal.      Nose: Nose normal. No rhinorrhea.      Mouth/Throat:      Mouth: Mucous membranes are moist.      Comments: ETT in place, OG in place  Eyes:      General: No scleral icterus.     Conjunctiva/sclera:      Right eye: Right conjunctiva is not injected. No chemosis.     Left eye: Left conjunctiva is not injected. No chemosis.     Comments: Pupils are fixed and dilated   Cardiovascular:      Rate and Rhythm: Regular rhythm. Bradycardia present.      Pulses: Normal pulses.      Heart sounds: No murmur heard.  Pulmonary:      Breath sounds: Normal breath sounds. No wheezing.   Chest:      Chest wall: No tenderness.   Abdominal:      Palpations: Abdomen is soft.      Tenderness: There is no abdominal tenderness. There is no guarding or rebound.  "  Genitourinary:     Comments: Thornton in place  Musculoskeletal:      Right lower leg: No edema.      Left lower leg: No edema.   Lymphadenopathy:      Cervical: No cervical adenopathy.   Skin:     General: Skin is warm and dry.      Capillary Refill: Capillary refill takes less than 2 seconds.      Findings: No rash.   Neurological:      Comments: Bilateral toes upgoing, ?corneal, sluggish cough/gag, no w/d to pain   Psychiatric:      Comments: Unable to assess         Laboratory:  Recent Labs     04/12/25  0950   WBC 24.9*   RBC 5.47   HEMOGLOBIN 16.3   HEMATOCRIT 47.3   MCV 86.5   MCH 29.8   MCHC 34.5   RDW 36.0   PLATELETCT 360   MPV 10.6     Recent Labs     04/12/25  0950   SODIUM 147*   POTASSIUM 3.8   CHLORIDE 107   CO2 16*   GLUCOSE 199*   BUN 25*   CREATININE 1.29   CALCIUM 9.9     Recent Labs     04/12/25  0950   ALTSGPT 13   ASTSGOT 23   ALKPHOSPHAT 58   TBILIRUBIN 1.1   GLUCOSE 199*     Recent Labs     04/12/25  0950   APTT 23.7*   INR 1.12     No results for input(s): \"NTPROBNP\" in the last 72 hours.  Recent Labs     04/12/25  0950   TRIGLYCERIDE 142     Recent Labs     04/12/25  0950   TROPONINT 7       Imaging:  CT-CSPINE WITHOUT PLUS RECONS   Final Result         1. No cervical spine fracture or subluxation to T1 evident.                     CT-CTA NECK WITH & W/O-POST PROCESSING   Final Result         1. No carotid or vertebral artery abnormality in the neck.      2. See separate CTA head report.                  CT-CTA HEAD WITH & W/O-POST PROCESS   Final Result         1. Severe hydrocephalus with convexity sulci and basal cisterns effaced by mass effect. Uncal herniation possible.      2. 6 cm medial left frontal lobe mass with internal hemorrhage density and shift to the right. Tumor possible.      3. Left occipital horn intraventricular hemorrhage.                  DX-CHEST-PORTABLE (1 VIEW)   Final Result         1. ET tube tip about 2.5 cm above the daniel. NG tube in stomach.      2. Lungs " clear. Tips of the lung apices are just off the film.                     EC-ECHOCARDIOGRAM COMPLETE W/O CONT    (Results Pending)       Assessment/Plan:  Justification for Admission Status  I anticipate this patient will require at least two midnights for appropriate medical management, necessitating inpatient admission because of large hemorrhagic brain mass with associated hydrocephalus and uncal herniation     Patient will need a ICU (Adult and Pediatrics) bed on MEDICAL service .  The need is secondary to ventilator management for acute encephalopathy and acute hypoxic respiratory failure due to large hemorrhagic brain mass with associated hydrocephalus and uncal herniation.    * Brain mass- (present on admission)  Assessment & Plan  Likely primary in nature with associated hemorrhage and obstructing hydrocephalus  NSG and neurology consulted  Not a surgical candidate due to significant signs of brainstem dysfunction  Father would like patient to be comfort care status    Leukocytosis- (present on admission)  Assessment & Plan  Suspect reactive  Following off antibiotics as there are no signs of infection    Lactic acidosis- (present on admission)  Assessment & Plan  ?due to hypoxia  Following     Hyperglycemia- (present on admission)  Assessment & Plan  ?reactive  Check HbA1c  BG goals 120-180s  Medium ISS  Hypoglycemia protocols     Hypernatremia- (present on admission)  Assessment & Plan  ?due to 3% and mannitol  Monitor UOP for DI  Following      Uncal herniation (HCC)- (present on admission)  Assessment & Plan  Due to hydrocephalus and brain mass  S/p mannitol and 3%  Pt is very near breath death    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)- (present on admission)  Assessment & Plan  Causing uncal herniation  S/p mannitol and 3% infusion without change to neurological status  Not an EVD placement candidate due to significant signs of brainstem dysfunction  Patient is very near brain death    Acute  encephalopathy- (present on admission)  Assessment & Plan  Due to significant brainstem dysfunction from obstructing hydrocephalus and uncal herniation  No obvious sign of seizures  Limit sedatives  Cont to correct all underlying metabolic abnormalities     Acute respiratory failure with hypoxia (HCC)- (present on admission)  Assessment & Plan  Intubated for hypoxia and low GCS due to brain tumor/obstructing hydrocephalus  Cont full vent support  RT/O2 protocols  Cont vent bundle protocols  I am actively adjusting vent settings based on ABGs and vent mechanics  No SAT/SBTs        VTE prophylaxis: SCDs/TEDs and pharmacologic prophylaxis contraindicated due to hemorrhagic brain mass    The patient remains critically ill.  I have assessed and reassessed the respiratory status and made ventilator adjustments based upon arterial blood gas analysis, ventilator waveforms and airway mechanics.  I have assessed and reassessed the blood pressure, hemodynamics, cardiovascular status. This patient remains at high risk for worsening cardiopulmonary dysfunction and death without the above critical care interventions.    Critical care time = 106 minutes.

## 2025-04-12 NOTE — CONSULTS
24 year old male presented with altered mental status, extensor posturing and a left dilated pupil. Patient was found down by his friend who alerted EMS. Upon arrival to the ED, patient was intubated for airway protection and CT scans were obtained. CTA head shows severe hydrocephalus with convexity and basal cisterna effaced by mass effect. 6 mm medial left frontal lobe mass with hemorrhage extending into the left intraventricular space. High concern for uncal herniation. NSG was consulted immediately by ERP. Patient received Mannitol and dexamethasone. On exam, patient has no brain stem reflexes. No cough, gag or corneal. Bilateral pupils are dilated and fixed, no response to light stimulation. Up going toes bilaterally. Bradycardia worsened through out exam, sedation was turned off.     ERP and NSG as well as our team aided in discussion with patients father regarding goals of care and prognosis. Decision was made to continue with comfort measures as soon as his partner arrives and to be at bedside. All questions answered.       CTA HEAD IMPRESSION:     1. Severe hydrocephalus with convexity sulci and basal cisterns effaced by mass effect. Uncal herniation possible.     2. 6 cm medial left frontal lobe mass with internal hemorrhage density and shift to the right. Tumor possible.     3. Left occipital horn intraventricular hemorrhage.      ARAM Bianchi  Acute Neurology

## 2025-04-12 NOTE — ED NOTES
PT unable to participate in med rec interview.    Medication history reviewed historically    Med rec is complete per historic review    Allergies reviewed.     Patient has no history of any outpatient antibiotics in the last 30 days.     Patient has no history of taking anticoagulants.    Dispense history is available.    Preferred pharmacy for this visit - [facility] on [address] ([phone])

## 2025-04-12 NOTE — ED NOTES
Pt to red 10 BIB [facility]. Found unresponsive this morning by family. Hx of migraines of the last two days with worsening symptoms per EMS report from family.  Per report no Hx of seizures, no known trauma noted. Upon pt arrival ERP at bedside. Respiratory at bedside, pharmacy at bedside. Multiple RNs at bedside. Pt exhibiting decerebrate posturing. L pupil 5mm, R pupil 3mm. EMS reported R ear drainage on their assessment. Pt unresponsive, GCS 3 on arrival. Preparing for intubation.   R PIV placed. Labs collected and sent.    0946 60mg IV prop administered   0946 50mg IV Eber administered      0951 Dr. Rodriguez at bedside to assist with intubation   0956 intubated 7.0ETT, + color change & breath sounds, CXR at bedside    0959 25% mannitol prepared by pharmacy, administered. See MAR

## 2025-04-12 NOTE — RESPIRATORY CARE
S.T.O.P for Intrahospital Transportation Safety for Ventilated Patients     S - Newton then maneuver.  Were airway secretions cleared? yes    T - Check your tube. Is your airway patent and in correct position? yes    O - Transition from oxygen cylinder to wall oxygen source on return.  Was this accomplished?  yes    P - Transition from ventilator battery power to wall power source on return.  Was this accomplished?  yes

## 2025-04-12 NOTE — ED NOTES
Pts family member informed this RN that pt appeared to be having an allergic rxn to medication , upon assessment pt appears to have hives on chest , face and neck, provider contacted , new orders placed , primary RN Quinton berry

## 2025-04-12 NOTE — CARE PLAN
Problem: Ventilation  Goal: Ability to achieve and maintain unassisted ventilation or tolerate decreased levels of ventilator support  Description: Target End Date:  4 days Document on Vent flowsheet1.  Support and monitor invasive and noninvasive mechanical ventilation2.  Monitor ventilator weaning response3.  Perform ventilator associated pneumonia prevention interventions4.  Manage ventilation therapy by monitoring diagnostic test results  Outcome: Not Progressing     Ventilator Daily Summary    Vent Day #1  Airway: 7.0@23    Ventilator settings: 24/420/8/30%  Weaning trials: none  Respiratory Procedures: none    Plan: Continue current ventilator settings and wean mechanical ventilation as tolerated per physician orders.

## 2025-04-12 NOTE — ASSESSMENT & PLAN NOTE
Causing uncal herniation  S/p mannitol and 3% infusion without change to neurological status  Not an EVD placement candidate due to significant signs of brainstem dysfunction  Patient is very near brain death

## 2025-04-12 NOTE — PROGRESS NOTES
Dr Tejada asked me to place comfort care orders for this patient as the family is ready to transition to comfort care measures only.

## 2025-04-12 NOTE — ASSESSMENT & PLAN NOTE
Due to significant brainstem dysfunction from obstructing hydrocephalus and uncal herniation  No obvious sign of seizures  Limit sedatives  Cont to correct all underlying metabolic abnormalities

## 2025-04-12 NOTE — ASSESSMENT & PLAN NOTE
Intubated for hypoxia and low GCS due to brain tumor/obstructing hydrocephalus  Cont full vent support  RT/O2 protocols  Cont vent bundle protocols  I am actively adjusting vent settings based on ABGs and vent mechanics  No SAT/SBTs

## 2025-04-12 NOTE — ED NOTES
Jeanie from [facility] here for transport. Bedside report given. Pt with RN, RT, and tech with pt on monitor. Pt belongings with family at time of transfer to unit.

## 2025-04-12 NOTE — CONSULTS
Surgery Neurosurgery Consultation    Date of Service  4/12/2025    Referring Physician  Michael Carolina M.D.    Consulting Physician  Dano Recinos M.D.    Reason for Consultation  Hemorrhagic brain mass  Hydrocephalus  Cerebral herniation    History of Presenting Illness  24 y.o. male who presented 4/12/2025 with acute altered mental status.  His father states that his friend said he has been having headaches for several months.  He was seen a few days ago in the emergency room for intermittent headaches.  He came to the emergency room today after being found unresponsive by his father, unknown length of time down.  There is no history of trauma.  Upon initial evaluation, he was noted to have a dilated left pupil with abnormal reflexes in all 4 extremities.  A head CT shows a large left frontal hemorrhagic mass with.  The patient was immediately given mannitol even prior to his imaging results given clinical suspicion for an intracranial lesion.  Neurology and neurosurgery were consulted immediately.    Review of Systems  Review of Systems   Unable to perform ROS: Critical illness       Past Medical History   has a past medical history of ASTHMA.    He has no past medical history of CAD (coronary artery disease), Cancer (HCC), Congestive heart failure (HCC), COPD, Diabetes, Hypertension, Infectious disease, Liver disease, Psychiatric disorder, Renal disorder, Seizure disorder (HCC), or Stroke (HCC).    Surgical History   has no past surgical history on file.    Family History  family history is not on file.    Social History   reports that he has never smoked. He has never used smokeless tobacco. He reports current alcohol use. He reports that he does not use drugs.    Medications  Prior to Admission Medications   Prescriptions Last Dose Informant Patient Reported? Taking?   SUMAtriptan (IMITREX) 25 MG Tab tablet   No No   Sig: Take 1-2 Tablets by mouth one time as needed for Migraine for up to 1 dose.       Facility-Administered Medications: None       Allergies  Allergies   Allergen Reactions    Cat Hair Extract Shortness of Breath     And dogs,  Short of breath       Physical Exam  Temp:  [36.4 °C (97.6 °F)] 36.4 °C (97.6 °F)  Pulse:  [] 52  Resp:  [9-28] 24  BP: (126-169)/() 131/69  SpO2:  [93 %-100 %] 97 %    Physical Exam  Constitutional:       Comments: Intubated, no external signs of trauma, normal body habitus   HENT:      Head: Normocephalic and atraumatic.      Mouth/Throat:      Mouth: Mucous membranes are dry.      Pharynx: Oropharynx is clear.   Eyes:      Comments: Pupil 7 mm bilaterally and nonreactive   Neurological:      GCS: GCS eye subscore is 1. GCS verbal subscore is 1. GCS motor subscore is 2.      Comments: 7 mm and nonreactive bilaterally.  No cough, gag.  Weak bilateral corneal reflexes present.  Extensor posturing with central stimulation         Fluids  Date 04/12/25 0700 - 04/13/25 0659   Shift 6020-5215 5086-2570 6872-2533 24 Hour Total   INTAKE   Shift Total       OUTPUT   Urine 20   20   Shift Total 20   20   Weight (kg) 59 59 59 59       Laboratory  Recent Labs     04/12/25  0950   WBC 24.9*   RBC 5.47   HEMOGLOBIN 16.3   HEMATOCRIT 47.3   MCV 86.5   MCH 29.8   MCHC 34.5   RDW 36.0   PLATELETCT 360   MPV 10.6     Recent Labs     04/12/25  0950   SODIUM 147*   POTASSIUM 3.8   CHLORIDE 107   CO2 16*   GLUCOSE 199*   BUN 25*   CREATININE 1.29   CALCIUM 9.9     Recent Labs     04/12/25  0950   APTT 23.7*   INR 1.12          Recent Labs     04/12/25  0950   TRIGLYCERIDE 142        Imaging  CT-CSPINE WITHOUT PLUS RECONS   Final Result         1. No cervical spine fracture or subluxation to T1 evident.                     CT-CTA NECK WITH & W/O-POST PROCESSING   Final Result         1. No carotid or vertebral artery abnormality in the neck.      2. See separate CTA head report.                  CT-CTA HEAD WITH & W/O-POST PROCESS   Final Result         1. Severe hydrocephalus with  convexity sulci and basal cisterns effaced by mass effect. Uncal herniation possible.      2. 6 cm medial left frontal lobe mass with internal hemorrhage density and shift to the right. Tumor possible.      3. Left occipital horn intraventricular hemorrhage.                  DX-CHEST-PORTABLE (1 VIEW)   Final Result         1. ET tube tip about 2.5 cm above the daniel. NG tube in stomach.      2. Lungs clear. Tips of the lung apices are just off the film.                         Assessment/Plan  Left frontal hemorrhagic mass  Hydrocephalus  Cerebral herniation    Tom clearly had a left frontal brain mass which hemorrhaged resulting in acute hydrocephalus due to the mass effect.  There is no significant intraventricular hemorrhage.  His neurologic exam shows significant brainstem dysfunction.  He is bradycardic as well and in the latter stages of brain death.  Unfortunately, at this point, placing a ventriculostomy would not alleviate the underlying mass effect.  He is far too unstable to make it through a brain tumor resection and his neurologic function is consistent with the latter stages of brain death.  I discussed this with Dr. Ruff, Dr. Carolina and we had a conference with the father who does not wish for his son to suffer unnecessarily.  He is currently receiving 3% hypertonic saline as the father wishes for his partner to arrive.  I did explain that he is likely to undergo cardiac arrest, potentially very soon.  The father wishes to make his son comfort measures as soon as the partner arrives.     I was initially consulted by Dr. Carolina at 1026, I evaluated the patient at bedside at 1047

## 2025-04-12 NOTE — ED PROVIDER NOTES
"ED Provider Note    CHIEF COMPLAINT  AMS          HPI/ROS  LIMITATION TO HISTORY   Select: : None  OUTSIDE HISTORIAN(S):  Complete history taken from EMS as patient is obtunded.  Patient was found down at friend's house, unclear time down.  Glucose was normal, patient with noted blown pupil and flexural  posturing.    Tom Lares is a 24 y.o. male who presents unresponsive, patient found unresponsive by family per EMS.  Patient glucose was in the 200s, dilated pupil on right was noted, patient unresponsive and posturing.  Patient was complaining of headaches for the last few days.  Otherwise healthy.  Unfortunately patient unable to provide any further history as he is obtunded    PAST MEDICAL HISTORY   has a past medical history of ASTHMA.    SURGICAL HISTORY  patient denies any surgical history    FAMILY HISTORY  No family history on file.    SOCIAL HISTORY  Social History     Tobacco Use    Smoking status: Never    Smokeless tobacco: Never   Vaping Use    Vaping status: Never Used   Substance and Sexual Activity    Alcohol use: Yes     Comment: occa    Drug use: Never    Sexual activity: Not on file       CURRENT MEDICATIONS  Home Medications    **Home medications have not yet been reviewed for this encounter**       Audit from Redirected Encounters    **Home medications have not yet been reviewed for this encounter**         ALLERGIES  Allergies   Allergen Reactions    Cat Hair Extract Shortness of Breath     And dogs,  Short of breath       PHYSICAL EXAM  VITAL SIGNS: Ht 1.727 m (5' 8\")   Wt 59 kg (130 lb)   BMI 19.77 kg/m²    Physical Exam  Constitutional:       Appearance: He is normal weight.   HENT:      Head: Normocephalic and atraumatic.      Mouth/Throat:      Mouth: Mucous membranes are moist.   Eyes:      Comments: left pupil is dilated and fixed, right pupil midrange   Cardiovascular:      Rate and Rhythm: Normal rate and regular rhythm.   Pulmonary:      Breath sounds: No stridor. No " wheezing or rhonchi.      Comments: No gag reflex, emesis at lips  Abdominal:      General: There is no distension.      Palpations: There is no mass.   Skin:     General: Skin is warm.   Neurological:      Comments: Posturing bilaterally, to painful stimuli, Flexeril.  No peripheral fold movement otherwise.  Unresponsive to voice.  Obtunded          EKG/LABS  Results for orders placed or performed during the hospital encounter of 25   EKG (NOW)    Collection Time: 25  9:39 AM   Result Value Ref Range    Report       [facility] Emergency Dept.    Test Date:  2025  Pt Name:    MARK ZHAO            Department: ER  MRN:        [id number]                      Room:        10  Gender:     Male                         Technician: [id number]  :        2000                   Requested By:ER TRIAGE PROTOCOL  Order #:    [id number]                    Reading MD:    Measurements  Intervals                                Axis  Rate:       64                           P:          72  OR:         145                          QRS:        84  QRSD:       91                           T:          34  QT:         483  QTc:        499    Interpretive Statements  Sinus rhythm  Minimal ST depression, anterior leads  Prolonged QT interval  Baseline wander in lead(s) V2  No previous ECG available for comparison     CBC WITH DIFFERENTIAL    Collection Time: 25  9:50 AM   Result Value Ref Range    WBC 24.9 (H) 4.8 - 10.8 K/uL    RBC 5.47 4.70 - 6.10 M/uL    Hemoglobin 16.3 14.0 - 18.0 g/dL    Hematocrit 47.3 42.0 - 52.0 %    MCV 86.5 81.4 - 97.8 fL    MCH 29.8 27.0 - 33.0 pg    MCHC 34.5 32.3 - 36.5 g/dL    RDW 36.0 35.9 - 50.0 fL    Platelet Count 360 164 - 446 K/uL    MPV 10.6 9.0 - 12.9 fL    Neutrophils-Polys 81.40 (H) 44.00 - 72.00 %    Lymphocytes 11.70 (L) 22.00 - 41.00 %    Monocytes 5.60 0.00 - 13.40 %    Eosinophils 0.20 0.00 - 6.90 %    Basophils 0.30 0.00 - 1.80 %    Immature  Granulocytes 0.80 0.00 - 0.90 %    Nucleated RBC 0.00 0.00 - 0.20 /100 WBC    Neutrophils (Absolute) 20.28 (H) 1.82 - 7.42 K/uL    Lymphs (Absolute) 2.90 1.00 - 4.80 K/uL    Monos (Absolute) 1.40 (H) 0.00 - 0.85 K/uL    Eos (Absolute) 0.05 0.00 - 0.51 K/uL    Baso (Absolute) 0.07 0.00 - 0.12 K/uL    Immature Granulocytes (abs) 0.19 (H) 0.00 - 0.11 K/uL    NRBC (Absolute) 0.00 K/uL   COMP METABOLIC PANEL    Collection Time: 04/12/25  9:50 AM   Result Value Ref Range    Sodium 147 (H) 135 - 145 mmol/L    Potassium 3.8 3.6 - 5.5 mmol/L    Chloride 107 96 - 112 mmol/L    Co2 16 (L) 20 - 33 mmol/L    Anion Gap 24.0 (H) 7.0 - 16.0    Glucose 199 (H) 65 - 99 mg/dL    Bun 25 (H) 8 - 22 mg/dL    Creatinine 1.29 0.50 - 1.40 mg/dL    Calcium 9.9 8.5 - 10.5 mg/dL    Correct Calcium 8.8 8.5 - 10.5 mg/dL    AST(SGOT) 23 12 - 45 U/L    ALT(SGPT) 13 2 - 50 U/L    Alkaline Phosphatase 58 30 - 99 U/L    Total Bilirubin 1.1 0.1 - 1.5 mg/dL    Albumin 5.4 (H) 3.2 - 4.9 g/dL    Total Protein 8.5 (H) 6.0 - 8.2 g/dL    Globulin 3.1 1.9 - 3.5 g/dL    A-G Ratio 1.7 g/dL   PROTHROMBIN TIME    Collection Time: 04/12/25  9:50 AM   Result Value Ref Range    PT 14.5 12.0 - 14.6 sec    INR 1.12 0.87 - 1.13   APTT    Collection Time: 04/12/25  9:50 AM   Result Value Ref Range    APTT 23.7 (L) 24.7 - 36.0 sec   COD (ADULT)    Collection Time: 04/12/25  9:50 AM   Result Value Ref Range    ABO Grouping Only B     Rh Grouping Only NEG     Antibody Screen-Cod NEG    TROPONIN    Collection Time: 04/12/25  9:50 AM   Result Value Ref Range    Troponin T 7 6 - 19 ng/L   Triglyceride    Collection Time: 04/12/25  9:50 AM   Result Value Ref Range    Triglycerides 142 0 - 149 mg/dL   ESTIMATED GFR    Collection Time: 04/12/25  9:50 AM   Result Value Ref Range    GFR (CKD-EPI) 80 >60 mL/min/1.73 m 2   POCT arterial blood gas device results    Collection Time: 04/12/25 10:53 AM   Result Value Ref Range    Ph 7.372 7.350 - 7.450    Pco2 37.3 32.0 - 48.0 mmHg     Po2 157 (H) 83 - 108 mmHg    Tco2 23 (L) 32 - 48 mmol/L    S02 99 93 - 99 %    Hco3 21.7 21.0 - 28.0 mmol/L    BE -3 -4 - 3 mmol/L    Body Temp 36.4 C degrees    O2 Therapy 50 %    iPF Ratio 314     Ph Temp Radha 7.381 7.350 - 7.450    Pco2 Temp Co 36.3 32.0 - 48.0 mmHg    Po2 Temp Cor 154 (H) 83 - 108 mmHg    Specimen Arterial     DelSys Vent     Tidal Volume 400 mL    Peep End Expiratory Pressure 8 cmh20    Set Rate 24     Mode APV-CMV    POCT lactate device results    Collection Time: 04/12/25 10:53 AM   Result Value Ref Range    iStat Lactate 3.7 (H) 0.5 - 2.0 mmol/L     '  I have independently interpreted this EKG    RADIOLOGY/PROCEDURES   I have independently interpreted the diagnostic imaging associated with this visit and am waiting the final reading from the radiologist.   My preliminary interpretation is as follows: Obvious brain mass with associated bleed and significant hydrocephalus and shift    Radiologist interpretation:  CT-CSPINE WITHOUT PLUS RECONS   Final Result         1. No cervical spine fracture or subluxation to T1 evident.                     CT-CTA NECK WITH & W/O-POST PROCESSING   Final Result         1. No carotid or vertebral artery abnormality in the neck.      2. See separate CTA head report.                  CT-CTA HEAD WITH & W/O-POST PROCESS   Final Result         1. Severe hydrocephalus with convexity sulci and basal cisterns effaced by mass effect. Uncal herniation possible.      2. 6 cm medial left frontal lobe mass with internal hemorrhage density and shift to the right. Tumor possible.      3. Left occipital horn intraventricular hemorrhage.                  DX-CHEST-PORTABLE (1 VIEW)   Final Result         1. ET tube tip about 2.5 cm above the daniel. NG tube in stomach.      2. Lungs clear. Tips of the lung apices are just off the film.                         Intubation Procedure Note    Indication: comatose state    Consent: Unable to be obtained due to the emergent nature  of this procedure.    Medications Used: propofol intravenously and rocuronium    Procedure: The patient was placed in the appropriate position.  Initial attempt was made with DL, however visualization was very poor, therefore we transition to VL.  In between patient was bagged.  He never developed any significant hypoxia or bradycardia.  With VL, and a 7.5 tube I am unable to advance the ET tube given the very narrow glottic opening.  Therefore patient was bagged again, again patient did not develop any significant hypoxia or bradycardia, he was bagged using hyperventilation during these intervals.  Using VL, and a 7-0 tube was able to intubate patient successfully.  a 7.0 cuffed endotracheal tube.  The cuff was then inflated and the tube was secured appropriately at a distance of 22 cm to the dental ridge.  Initial confirmation of placement included bilateral breath sounds, an end tidal CO2 detector, absence of sounds over the stomach, tube fogging, adequate chest rise, adequate pulse oximetry reading/  A chest x-ray to verify correct placement of the tube showed appropriate tube position.    The patient tolerated the procedure well.     Complications:   multiple attempts      CRITICAL CARE  The very real possibilty of a deterioration of this patient's condition required the highest level of my preparedness for sudden, emergent intervention.  I provided critical care services, which included medication orders, frequent reevaluations of the patient's condition and response to treatment, ordering and reviewing test results, and discussing the case with various consultants.  The critical care time associated with the care of the patient was 60 minutes. Review chart for interventions. This time is exclusive of any other billable procedures.       COURSE & MEDICAL DECISION MAKING    ASSESSMENT, COURSE AND PLAN  Care Narrative:   Patient here obtunded with blown pupil.  Highly concerning for dangerous intracranial  pathology.    Patient glucose was rechecked, he is not hypoglycemic.  patient intubated as above.  Patient is obtunded and patient is not protecting airway.  Patient was a difficult intubation but thankfully maintain sats above 95% throughout the entire intubation.  Labs were drawn.  EKG fails to reveal any evidence of immediate arrhythmia.  Questionable peaked T waves but no widened QRS, concern for possible ICP.  Given patient's significant posturing, his blown pupil, I am very concerned about increased ICP and therefore mannitol was given.  Head of bed at 30 degrees.  Patient hyperventilated. Keppra ordered. Chest x-ray was checked.  Immediate following intubation patient was sent directly to CT for neuroimaging.  Accompanied patient to CT, CT reveals obvious mass with associated bleed, shift and hydrocephalus, neurosurgery and neurology immediately paged.  I discussed the case with neurosurgery and neurology, both will emergently review the case and see patient at bedside.    Patient returned to his room, he started to have bradycardia, therefore was given hypertonic saline, neurosurgery agrees with this.  Neurosurgery has reviewed the case as well as neurology, they both evaluated patient independently, and believe that the patient's mass and bleed are unfortunately catastrophic and that he does not have any chance of a meaningful outcome neurologically.  Nonetheless we had a long conversation with patient's father regarding goals of care, and offered to perform EVD and neurosurgery, father is asking that we pursue comfort measures only at this point.  We we will respect patient's father's wishes, and we had a discussion regarding organ donation, he states that his son would want to be an organ donor and therefore patient will be placed in the ICU, organ donor network contacted.  Patient case discussed with intensivist who will admit.              DISPOSITION AND DISCUSSIONS  I have discussed management of the  patient with the following physicians and CELENA's: Dr. Tejada of intensive care, Dr. Lepe of neurosurgery, Dr. Ruff of neurology      Escalation of care considered, and ultimately not performed: Neurosurgery was deferred as patient was placed on comfort measures only per family wishes        FINAL DIAGNOSIS  1. Altered mental status, unspecified altered mental status type        2. Brain mass

## 2025-04-12 NOTE — FLOWSHEET NOTE
04/12/25 1030   Events/Summary/Plan   Events/Summary/Plan Intubated in ER ALOC   Ventiliation   $ Ventilation - Initial Yes   Vent Clock Initiated Yes

## 2025-04-12 NOTE — PROGRESS NOTES
Pulmonary/Critical Care Medicine   Progress Note    Date of service: 4/12/2025  Time: 1350    Notified by RN that patient having an allergic reaction with hives to chest, face, and neck.  I have discontinued the propofol and fentanyl.  I have ordered one time doses of Benadryl 50mg IV, methylprednisolone 125mg IV, and Pepcid 40mg IV.    Kinga Tejada MD  Pulmonary and Critical Care Medicine

## 2025-04-12 NOTE — ED NOTES
Pt experiencing bradycardia on the monitor. Propofol paused at this time. Pt placed on cardiac pads and monitor.

## 2025-04-12 NOTE — DISCHARGE PLANNING
MSW responded to acute medical pt in [facility]. Pt was [facility] and [employer] after being found down by a friend. MSW met with pt's father Angel ([phone]) and provided brief update.     MSW met with pt's father and care team to update him on pt's prognosis. MSW escorted family to bedside. Will remain available for support.

## 2025-04-13 NOTE — DISCHARGE SUMMARY
Death Summary    Cause of Death  Acute hypoxic and hypercapnic respiratory failure due to uncal herniation due to obstructive hydrocephalus due to hemorrhagic brain mass.    Comorbid Conditions at the Time of Death  Principal Problem:    Brain mass (POA: Yes)  Active Problems:    Acute respiratory failure with hypoxia (HCC) (POA: Yes)    Acute encephalopathy (POA: Yes)    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) (POA: Yes)    Uncal herniation (HCC) (POA: Yes)    Hypernatremia (POA: Yes)    Hyperglycemia (POA: Yes)    Lactic acidosis (POA: Yes)    Leukocytosis (POA: Yes)  Resolved Problems:    * No resolved hospital problems. *      History of Presenting Illness and Hospital Course  This is a 24 y.o. male admitted 4/12/2025 with acute hemorrhagic brain mass, encephalopathy, and respiratory failure.  Tom Lares is a healthy 24 y.o. male who was found by his friend this morning unresponsive and activated EMS.  EMS reports that the patient had been complaining of worsening headache for the past week.  He was seen in the ER on 4/9 and treated for migraine headache.  Upon arrival to the ER the patient was exhibiting decerebrate posturing with left pupil dilation and GCS of 3.  He was emergently intubated and a STAT CT head was obtained showed a hemorrhagic brain mass with obstructive hydrocephalus with uncal herniation.  The patient received mannitol and 3% saline with no improvement to the patient's neurological status. The ER MD, neurology, and neurosurgical team spoke with the patient's father who has made him a DNR and does not want him to suffer.  Ultimately, the family did not want to explore organ donation and made the compassionate decision to transition to comfort focused care.  The patient was compassionately extubated and passed away peacefully surrounded by his loved ones.    Death Date: 04/12/25   Death Time: 1851         Pronounced By (RN1): Jeanie Stark  Pronounced By (RN2): Maddie  Judy

## 2025-04-13 NOTE — PROGRESS NOTES
Plan was to move towards CMO only patient had expressed he did not want to undergo organ donation and this was described to earlier MD and his family again continued to these wishes. Patient is not elected organ donation on his license per discussion with primary MD. I was called back by OPO to discuss this with the patient wife. I came to bedside apologized to the patient family and offered our deepest condolences for this unfortunate event. I then spoke with his wife Reynaldo and she again re-iterated that patient didn't wish to be an organ donor and they wished to proceed with comfort measures with the patient father and step mother all at the bedside.

## 2025-04-15 LAB — GLUCOSE BLD STRIP.AUTO-MCNC: 195 MG/DL (ref 65–99)
